# Patient Record
Sex: FEMALE | URBAN - METROPOLITAN AREA
[De-identification: names, ages, dates, MRNs, and addresses within clinical notes are randomized per-mention and may not be internally consistent; named-entity substitution may affect disease eponyms.]

---

## 2017-03-28 ENCOUNTER — APPOINTMENT (RX ONLY)
Dept: URBAN - METROPOLITAN AREA CLINIC 124 | Facility: CLINIC | Age: 82
Setting detail: DERMATOLOGY
End: 2017-03-28

## 2017-03-28 DIAGNOSIS — L82.1 OTHER SEBORRHEIC KERATOSIS: ICD-10-CM

## 2017-03-28 DIAGNOSIS — D485 NEOPLASM OF UNCERTAIN BEHAVIOR OF SKIN: ICD-10-CM

## 2017-03-28 PROBLEM — D48.5 NEOPLASM OF UNCERTAIN BEHAVIOR OF SKIN: Status: ACTIVE | Noted: 2017-03-28

## 2017-03-28 PROCEDURE — 11101: CPT

## 2017-03-28 PROCEDURE — ? COUNSELING

## 2017-03-28 PROCEDURE — ? BIOPSY BY SHAVE METHOD

## 2017-03-28 PROCEDURE — 99213 OFFICE O/P EST LOW 20 MIN: CPT | Mod: 25

## 2017-03-28 PROCEDURE — 11100: CPT

## 2017-03-28 ASSESSMENT — LOCATION DETAILED DESCRIPTION DERM
LOCATION DETAILED: LEFT INFERIOR CENTRAL MALAR CHEEK
LOCATION DETAILED: RIGHT INFERIOR CENTRAL MALAR CHEEK
LOCATION DETAILED: LEFT INFERIOR NASAL CHEEK

## 2017-03-28 ASSESSMENT — LOCATION ZONE DERM: LOCATION ZONE: FACE

## 2017-03-28 ASSESSMENT — LOCATION SIMPLE DESCRIPTION DERM
LOCATION SIMPLE: RIGHT CHEEK
LOCATION SIMPLE: LEFT CHEEK

## 2017-03-28 NOTE — PROCEDURE: BIOPSY BY SHAVE METHOD
Bill For Surgical Tray: no
X Size Of Lesion In Cm: 0
Notification Instructions: Patient will be notified of biopsy results. However, patient instructed to call the office if not contacted within 2 weeks.
Lab: [facility]
Electrodesiccation And Curettage Text: The wound bed was treated with electrodesiccation and curettage after the biopsy was performed.
Body Location Override (Optional - Billing Will Still Be Based On Selected Body Map Location If Applicable): Right cheek
Biopsy Type: H and E
Billing Type: [employer]
Lab Facility: [id number]
Hemostasis: Aluminum Chloride
Dressing: pressure dressing with telfa
Type Of Destruction Used: Curettage
Size Of Lesion In Cm: 1.4
Anesthesia Type: 1% lidocaine with 1:100,000 epinephrine and a 1:10 solution of 8.4% sodium bicarbonate
Curettage Text: The wound bed was treated with curettage after the biopsy was performed.
Consent: Written consent was obtained and risks were reviewed including but not limited to scarring, infection, bleeding, scabbing, incomplete removal, nerve damage and allergy to anesthesia.
Detail Level: Simple
Wound Care: Vaseline
Electrodesiccation Text: The wound bed was treated with electrodesiccation after the biopsy was performed.
Cryotherapy Text: The wound bed was treated with cryotherapy after the biopsy was performed.
Anesthesia Volume In Cc (Will Not Render If 0): 0.3
Post-Care Instructions: I reviewed with the patient in detail post-care instructions. Patient is to keep the biopsy site dry overnight, and then apply bacitracin twice daily until healed. Patient may apply hydrogen peroxide soaks to remove any crusting.
Biopsy Method: Double edge Personna blades
Silver Nitrate Text: The wound bed was treated with silver nitrate after the biopsy was performed.
Size Of Lesion In Cm: 0.4
Billing Type: Third-Party Bill
Lab: [id number]
Lab Facility: [id number]
Body Location Override (Optional - Billing Will Still Be Based On Selected Body Map Location If Applicable): Left nasal sidewall

## 2017-04-27 ENCOUNTER — IMPORTED ENCOUNTER (OUTPATIENT)
Dept: URBAN - METROPOLITAN AREA CLINIC 31 | Facility: CLINIC | Age: 82
End: 2017-04-27

## 2017-04-27 PROBLEM — H50.10: Noted: 2017-04-27

## 2017-04-27 PROBLEM — H27.02: Noted: 2017-04-27

## 2017-04-27 PROBLEM — H40.1432: Noted: 2017-04-27

## 2017-04-27 PROBLEM — Z96.1: Noted: 2017-04-27

## 2017-04-27 PROCEDURE — 92060 SENSORIMOTOR EXAMINATION: CPT

## 2017-04-27 PROCEDURE — 92015 DETERMINE REFRACTIVE STATE: CPT

## 2017-04-27 PROCEDURE — V2520 CONTACT LENS HYDROPHILIC: HCPCS

## 2017-04-27 PROCEDURE — 92014 COMPRE OPH EXAM EST PT 1/>: CPT

## 2017-04-27 PROCEDURE — 92133 CPTRZD OPH DX IMG PST SGM ON: CPT

## 2017-04-27 NOTE — PATIENT DISCUSSION
1.  PXE OU - Stable glaucoma. Continue with current treatment plan. Discussed importance of compliance. Will continue to monitor. 2.  Psuedophakia OD - IOL stable. Monitor. 3. Aphakia OS - OK to refill CL RX4. 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 VF 24-2. with Dr. David Rivers.

## 2017-12-21 ENCOUNTER — IMPORTED ENCOUNTER (OUTPATIENT)
Dept: URBAN - METROPOLITAN AREA CLINIC 31 | Facility: CLINIC | Age: 82
End: 2017-12-21

## 2017-12-21 PROBLEM — H40.1112: Noted: 2017-12-21

## 2017-12-21 PROBLEM — H27.02: Noted: 2017-12-21

## 2017-12-21 PROBLEM — H40.1432: Noted: 2017-12-21

## 2017-12-21 PROBLEM — Z96.1: Noted: 2017-12-21

## 2017-12-21 PROBLEM — H40.1121: Noted: 2017-12-21

## 2017-12-21 PROCEDURE — 99213 OFFICE O/P EST LOW 20 MIN: CPT

## 2017-12-21 PROCEDURE — 92083 EXTENDED VISUAL FIELD XM: CPT

## 2017-12-21 NOTE — PATIENT DISCUSSION
1.  PXE OU - Continue with current treatment plan. Discussed importance of compliance. Will continue to monitor. 2.  Pseudophakia OD - IOL stable. Monitor. 3. Aphakia OS - Pt interested in new CL OS next visit recheck fit with Dr Luke Hernandez. Return for an appointment in 4 months for comprehensive exam. OCT Nerve. with Dr. Beth Hassan.

## 2018-02-13 ENCOUNTER — IMPORTED ENCOUNTER (OUTPATIENT)
Dept: URBAN - METROPOLITAN AREA CLINIC 31 | Facility: CLINIC | Age: 83
End: 2018-02-13

## 2018-02-13 PROCEDURE — 92310 CONTACT LENS FITTING OU: CPT

## 2018-02-13 PROCEDURE — 92015 DETERMINE REFRACTIVE STATE: CPT

## 2018-02-13 PROCEDURE — V2520 CONTACT LENS HYDROPHILIC: HCPCS

## 2018-02-13 NOTE — PATIENT DISCUSSION
1.  Cont with current cl in left eye- No OR improved any better. Pt happy with brand DACP 6.00 Dailies. Eval 802. Gave copy of rx so pt can get gls for when she takes her cl sout for right eyue. 3.  FU with DR Mi per his schedule.

## 2018-12-13 ENCOUNTER — IMPORTED ENCOUNTER (OUTPATIENT)
Dept: URBAN - METROPOLITAN AREA CLINIC 31 | Facility: CLINIC | Age: 83
End: 2018-12-13

## 2018-12-13 PROBLEM — Z96.1: Noted: 2018-12-13

## 2018-12-13 PROBLEM — H27.02: Noted: 2018-12-13

## 2018-12-13 PROBLEM — H40.1432: Noted: 2018-12-13

## 2018-12-13 PROCEDURE — 92310 CONTACT LENS FITTING OU: CPT

## 2018-12-13 PROCEDURE — 92133 CPTRZD OPH DX IMG PST SGM ON: CPT

## 2018-12-13 PROCEDURE — 92014 COMPRE OPH EXAM EST PT 1/>: CPT

## 2018-12-13 PROCEDURE — 92015 DETERMINE REFRACTIVE STATE: CPT

## 2018-12-13 NOTE — PATIENT DISCUSSION
1.  PXE OU - Continue with current treatment plan. Discussed importance of compliance. Will continue to monitor. 2.  Pseudophakia OD - IOL stable. Monitor. 3. Aphakia OS - Pt interested in new CL OS next visit recheck fit with Dr Pavan Philippe. Return for an appointment in 4 months for comprehensive exam. OCT Nerve. with Dr. Gabby Mcknight.

## 2018-12-13 NOTE — PATIENT DISCUSSION
1.  PXE OU - Continue  Betimol bid ou. LVF 12/17. OCT 12/13/18   Moderate thinnning ou. RNFL 81/90 very poor GCC. stable from [facility] 4/17. Will continue to monitor. 2.  Pseudophakia OD - IOL stable. Monitor. 3. Aphakia OS - 4. Exotropia- stable5.   Cont with current DACP 6.00 OS-- Eval 70. 6.  RTN 6 mths VF

## 2019-04-25 ENCOUNTER — IMPORTED ENCOUNTER (OUTPATIENT)
Dept: URBAN - METROPOLITAN AREA CLINIC 31 | Facility: CLINIC | Age: 84
End: 2019-04-25

## 2019-04-25 PROBLEM — Z96.1: Noted: 2019-04-25

## 2019-04-25 PROBLEM — H40.1432: Noted: 2019-04-25

## 2019-04-25 PROBLEM — H27.02: Noted: 2019-04-25

## 2019-04-25 PROCEDURE — 92015 DETERMINE REFRACTIVE STATE: CPT

## 2019-04-25 PROCEDURE — 99214 OFFICE O/P EST MOD 30 MIN: CPT

## 2019-04-25 NOTE — PATIENT DISCUSSION
1.  PXE OU - Continue with current treatment plan. Discussed importance of compliance. Will continue to monitor. 2.  Pseudophakia OD - IOL stable. Monitor. 3. Aphakia OS - stable with CLReturn for an appointment in 6 months for pressure check. VF 24-2. with Dr. Cooper Pereira.

## 2019-06-14 ENCOUNTER — APPOINTMENT (RX ONLY)
Dept: URBAN - METROPOLITAN AREA CLINIC 116 | Facility: CLINIC | Age: 84
Setting detail: DERMATOLOGY
End: 2019-06-14

## 2019-11-14 ENCOUNTER — IMPORTED ENCOUNTER (OUTPATIENT)
Dept: URBAN - METROPOLITAN AREA CLINIC 31 | Facility: CLINIC | Age: 84
End: 2019-11-14

## 2019-11-14 PROBLEM — H27.02: Noted: 2019-11-14

## 2019-11-14 PROBLEM — Z96.1: Noted: 2019-11-14

## 2019-11-14 PROBLEM — H40.1432: Noted: 2019-11-14

## 2019-11-14 PROCEDURE — 99214 OFFICE O/P EST MOD 30 MIN: CPT

## 2019-11-14 PROCEDURE — 92083 EXTENDED VISUAL FIELD XM: CPT

## 2019-11-14 NOTE — PATIENT DISCUSSION
1.  PXE OU - IOP at target VF stable. Continue with current treatment plan. Discussed importance of compliance. Will continue to monitor. 2.  Pseudophakia OD - IOL stable. Monitor. 3. Aphakia OS - stable with CL OK to refillReturn for an appointment in 6 months for comprehensive exam. OCT Nerve. with Dr. Olga Haywood.

## 2020-05-21 ENCOUNTER — IMPORTED ENCOUNTER (OUTPATIENT)
Dept: URBAN - METROPOLITAN AREA CLINIC 31 | Facility: CLINIC | Age: 85
End: 2020-05-21

## 2020-05-21 PROBLEM — Z96.1: Noted: 2020-05-21

## 2020-05-21 PROBLEM — H27.02: Noted: 2020-05-21

## 2020-05-21 PROBLEM — H40.1432: Noted: 2020-05-21

## 2020-05-21 PROCEDURE — 99213 OFFICE O/P EST LOW 20 MIN: CPT

## 2020-05-21 NOTE — PATIENT DISCUSSION
1.  PXE OU - IOP at target. Continue with current treatment plan. Discussed importance of compliance. Will continue to monitor. 2.  Pseudophakia OD - IOL stable. Monitor. 3. Aphakia OS - stable with CL OK to refillReturn for an appointment in 6 months for pressure check. VF 24-2. Fundus Photo Disc. with Dr. Dacia Hannon.

## 2020-07-13 NOTE — PATIENT DISCUSSION
Patient reports improvement in vision and eye pressure since arrival in [facility] ER. Since arrival, 250 mg of IV dorzolamide was administered.

## 2020-07-13 NOTE — PATIENT DISCUSSION
"POH: Patient reports that has been told she has high pressure in both eyes an dthat this ""runs in the family"".  Denies cataract sx

## 2020-07-13 NOTE — PATIENT DISCUSSION
Patient with hx of leukopenia, uterince cancer, and hypertensin, admitted for hypertensive urgency and for possible peisode of acute angle closure in the right eye. Acetazolamide PO and IV, as well as glaucoma drops, were administered and intraocular pressures in the right eye were reduced to 30mmHG and, most recently 20mmHg. Patient is not in angle closure currently, however, we must continue IOP management until she can see an Ophthalmologist after discharge.

## 2020-07-13 NOTE — PATIENT DISCUSSION
Please call to make a follow up appointment at [place] [phone] with a general eye doctor for a complete slit lamp examination.

## 2020-07-13 NOTE — PATIENT DISCUSSION
Patient transferred to [facility] ER (From [facility]) Prior to transfer, timolol drops and Acetazolamide 500mg PO was administered.

## 2020-07-20 NOTE — PATIENT DISCUSSION
IOP Max at [facility] 7/13/2020: 61 OD.  IOP lowered to 20s after Diamox IV.  Patient has been on Timolol, latanoprost, dorzolamide, brimonidine.

## 2020-07-24 NOTE — PATIENT DISCUSSION
IOP Max at [facility] 7/13/2020: 61 OD.  IOP lowered to 20s after Diamox IV.  Patient has been on Timolol, latanoprost, dorzolamide, brimonidine.

## 2020-07-31 NOTE — PROCEDURE NOTE: CLINICAL
PROCEDURE NOTE: Focal Laser, Retina MicroPulse OS. Diagnosis: Retinal Edema. Prior to focal laser, risks/benefits/alternatives to laser discussed including loss of vision and patient wished to proceed. Spot size: 100 um. Power: 100 mW. Number of pulses: 250. Patient tolerated procedure well. There were no complications. Post procedure instructions given. Carlos Mckeon

## 2020-08-31 NOTE — PATIENT DISCUSSION
pt has worsening CME on OCT MACULA today (8/31/2020) and scattered flame shaped hemorrhage.  Pt. has hx of metastiatic uterine cancer s/p chemo.  Recommend possible anti-VEGF inj.

## 2020-10-16 NOTE — PATIENT DISCUSSION
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  Dr. Nadiya Enrique next available.

## 2020-10-16 NOTE — PROCEDURE NOTE: CLINICAL
PROCEDURE NOTE: Lucentis 0.5 mg OS. Diagnosis: Stable Branch Retinal Vein Occlusion. Anesthesia: Topical. Prep: Betadine Drops and Scrubs. Prior to injection, risks/benefits/alternatives discussed including infection, loss of vision, hemorrhage, cataract, glaucoma, retinal tears or detachment and patient wished to proceed. Informed consent obtained. . Patient was advised the purpose of the treatment was to slow the progression of the disease, and may not improve visual acuity. Betadine prep was performed. Injection site: 3-4 mm from the limbus. Mask worn during procedure. A lid speculum was used. Intravitreal injection of Lucentis 0.5mg/0.05 ml was given. Discarded remaining *. CRA perfusion confirmed. The eye was irrigated with sterile eye rinse solution. The betadine was washed away. Count fingers vision was verified. The patient tolerated the procedure well and there were no complications from the procedure. Post procedure instructions given. Patient given office phone number/answering service number and advised to call immediately should there be an increase in floaters or redness, loss of vision or pain, or should they have any other questions or concerns. Patient was given the standard instruction sheet. Vira Austin

## 2020-10-21 NOTE — PATIENT DISCUSSION
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  Dr. Rob Taylor next available.

## 2020-10-23 NOTE — PATIENT DISCUSSION
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  Dr. Lowery First next available.

## 2020-10-23 NOTE — PATIENT DISCUSSION
improved, Continue pred QID & start cyclogel BID (sample  given), Will check labs ACE, RPR, FTA-ABS, HLA-b27, RF, lyme titer, Script given.

## 2020-10-23 NOTE — PATIENT DISCUSSION
S/P Lucentis on 10/16, maybe 2/2 low thrombolic event or vasculitis, see number one, will check ANCA.

## 2020-10-23 NOTE — PATIENT DISCUSSION
PAN UVEITIS OU, Moderate vitritis, no pain, no evidence of infection, Start pred QID & cyclogel (sample given) BID, will check labs. ACE, RPR, FTA-ABS, HLA-b27, RF, lyme titer, Script given. I have personally performed a face to face diagnostic evaluation on this patient. I have reviewed the ACP note and agree with the history, exam and plan of care, except as noted.

## 2020-10-27 NOTE — PATIENT DISCUSSION
10/27/20: WE'LL AWAIT RESOLUTION/IMPROVEMENT OF UVEITIS TO CONTINUE W ANTI-VEGF SINCE PT MAY BENEFIT FROM STEROIDS. MONITOR CLINICALLY.

## 2020-10-27 NOTE — PATIENT DISCUSSION
The patient was asked to get an EXTENSIVE SEROLOGICAL WORKUP in an effort to identify any systemic autoimmune or infectious etiology as the cause for their severe inflammation. PT GOT HER BLOOD WORK TODAY. WE'LL REVIEW THE RESULTS AND TREAT ACCORDINGLY. MAIN DDX: INFECTIOUS -GIVEN HER IMMUNOSUPPRESSION-, CA OR AUTOIMMUNE (LESS LIKELY). LOW THRESHOLD TO START PO STEROIDS ONCE INFECTION HAS BEEN RULED OUT.

## 2020-11-06 NOTE — PROCEDURE NOTE: CLINICAL
PROCEDURE NOTE: Sub-Tenon’s Kenalog* OD. Diagnosis: Panuveitis. Anesthesia: Topical. Prep: Betadine Flush. Prior to injection, risks/benefits/alternatives discussed including infection, loss of vision, hemorrhage, cataract, glaucoma, elevated intraocular pressure and lid swelling. Betadine prep was performed. Sub-Tenon’s injection of Kenalog 40 mg/1 ml was given. The remainder of the Sub-Tenon’s kenalog was discarded in the medical waste. Time of injection: *. Patient tolerated procedure well. There were no complications. Post-op instructions given. Patient given office phone number/answering service number and advised to call immediately should there be an increase in floaters or redness, loss of vision or pain, or should they have any other questions or concerns. Oleg Ruiz

## 2020-11-19 NOTE — PATIENT DISCUSSION
11/19/20: LIKELY 2/2 #3, BUT POSSIBLY INFLUENCED BY A REVERSE PUPILLARY BLOCK VS IRIS RETRACTION SX- DILATION.

## 2020-11-19 NOTE — PATIENT DISCUSSION
11/6/20: SIGNIFICANT IMPROVEMENT OF INFLAMMATION IN ANTERIOR SEGMENT. LABS UNREMARKABLE. [address] [facility] [address] MCCOY TO REQUEST PET SCAN TO R/O METS FROM UTERINE CA. UNLIKELY TO BE TB, BUT WE'LL SEND TEST. RF + BUT LIKELY FALSE + GIVEN AGE. WILL DO PST OD TO EVAL RESPONSE.

## 2020-11-19 NOTE — PATIENT DISCUSSION
WILL CONT ATROPINE. REINFORCED IMPORTANCE OF MEDICATION TO AVOID IOP SPIKES. WILL DO GONIO NEXT VISIT, ANGLE SEEMS TO BE OPEN.  PO STEROIDS 60MG/D.

## 2020-11-19 NOTE — PATIENT DISCUSSION
11/19/20: SIMILAR AMT OF INFLAMMATION. PT WILL UNDERGO PET SCAN NEXT WK TO DETERMINE INFLUENCE OF SYSTEMIC CONDITION, BUT LIKELY 2/2 Renelle Ped.

## 2020-12-04 NOTE — PATIENT DISCUSSION
12/4/20: DISCUSSED W PT RESULTS OF PET SCAN: SYSTEMIC PROGRESSION. OCULAR INFLAMMATION ONLY. PRED 60 MG/D AND F/U IN 1WK. PT IS AWARE.

## 2020-12-04 NOTE — PATIENT DISCUSSION
11/6/20: SIGNIFICANT IMPROVEMENT OF INFLAMMATION IN ANTERIOR SEGMENT. LABS UNREMARKABLE. [address] [facility] [address] MCCOY TO REQUEST PET SCAN TO R/O METS FROM UTERINE CA. UNLIKELY TO BE TB, BUT WE'LL SEND TEST. RF + BUT LIKELY FALSE + GIVEN AGE. WILL DO PST OD TO EVAL RESPONSE.

## 2020-12-04 NOTE — PATIENT DISCUSSION
11/19/20: SIMILAR AMT OF INFLAMMATION. PT WILL UNDERGO PET SCAN NEXT WK TO DETERMINE INFLUENCE OF SYSTEMIC CONDITION, BUT LIKELY 2/2 Jen Herrera.

## 2020-12-14 NOTE — PATIENT DISCUSSION
11/19/20: SIMILAR AMT OF INFLAMMATION. PT WILL UNDERGO PET SCAN NEXT WK TO DETERMINE INFLUENCE OF SYSTEMIC CONDITION, BUT LIKELY 2/2 Paulette Ford.

## 2020-12-14 NOTE — PATIENT DISCUSSION
12/14/20: PT HASN'T SEEN DR MCCOY. SHE'S NOT KEEN ABOUT CHEMO, WHICH IS HER ONLY OPTION AT THIS TIME. PRED HAS CAUSED WORSE EDEMA IN HER BODY, BUT HER EYES ARE SLOWLY GETTING BETTER. VA IN OD IS LIMITED 2/2 PSC & INFLAMMATION. WILL EVAL IN 1 WK AFTER 2 WKS OF TX W PREDNISONE. CONSIDER ANTI-VEGF. DISCUSSED AGAIN [address] [place].

## 2020-12-14 NOTE — PATIENT DISCUSSION
12/14/20: likely RELATED TO UVEITIS. WILL CONT TX WITH PREDNISONE & REASSESS AFTER EVAL BY DR MCCOY.

## 2020-12-14 NOTE — PATIENT DISCUSSION
11/6/20: SIGNIFICANT IMPROVEMENT OF INFLAMMATION IN ANTERIOR SEGMENT. LABS UNREMARKABLE. [address] [facility] [address] MCCOY TO REQUEST PET SCAN TO R/O METS FROM UTERINE CA. UNLIKELY TO BE TB, BUT WE'LL SEND TEST. RF + BUT LIKELY FALSE + GIVEN AGE. WILL DO PST OD TO EVAL RESPONSE.

## 2020-12-22 NOTE — PATIENT DISCUSSION
12/22/20: PT [facility]. WILL REASSESS W SCAN IN FEB. SIGNIFICANT IMPROVEMENT SEEN TODAY OD>OS. WILL START TAPERING PREDNISONE TO 50 & PF SLOWLY SINCE PT HAS LYMPHEDEMA.

## 2020-12-22 NOTE — PATIENT DISCUSSION
12/14/20: PT HASN'T SEEN DR MCCOY. SHE'S NOT KEEN ABOUT CHEMO, WHICH IS HER ONLY OPTION AT THIS TIME. PRED HAS CAUSED WORSE EDEMA IN HER BODY, BUT HER EYES ARE SLOWLY GETTING BETTER. VA IN OD IS LIMITED 2/2 PSC & INFLAMMATION. WILL EVAL IN 1 WK AFTER 2 WKS OF TX W PREDNISONE. CONSIDER ANTI-VEGF. DISCUSSED AGAIN [address] [place].

## 2020-12-22 NOTE — PATIENT DISCUSSION
11/6/20: SIGNIFICANT IMPROVEMENT OF INFLAMMATION IN ANTERIOR SEGMENT. LABS UNREMARKABLE. [address] [facility] [address] MCCOY TO REQUEST PET SCAN TO R/O METS FROM UTERINE CA. UNLIKELY TO BE TB, BUT WE'LL SEND TEST. RF + BUT LIKELY FALSE + GIVEN AGE. WILL DO PST OD TO EVAL RESPONSE.

## 2020-12-22 NOTE — PATIENT DISCUSSION
11/19/20: SIMILAR AMT OF INFLAMMATION. PT WILL UNDERGO PET SCAN NEXT WK TO DETERMINE INFLUENCE OF SYSTEMIC CONDITION, BUT LIKELY 2/2 Rivas Bunting.

## 2021-01-05 NOTE — PATIENT DISCUSSION
11/6/20: SIGNIFICANT IMPROVEMENT OF INFLAMMATION IN ANTERIOR SEGMENT. LABS UNREMARKABLE. [address] [facility] [address] MCCOY TO REQUEST PET SCAN TO R/O METS FROM UTERINE CA. UNLIKELY TO BE TB, BUT WE'LL SEND TEST. RF + BUT LIKELY FALSE + GIVEN AGE. WILL DO PST OD TO EVAL RESPONSE.

## 2021-01-05 NOTE — PATIENT DISCUSSION
12/22/20: PT 10 [facility]. WILL REASSESS W SCAN IN FEB. SIGNIFICANT IMPROVEMENT SEEN TODAY OD>OS. WILL START TAPERING PREDNISONE TO 50 & PF SLOWLY SINCE PT HAS LYMPHEDEMA.

## 2021-01-05 NOTE — PATIENT DISCUSSION
12/14/20: PT HASN'T SEEN DR MCCOY. SHE'S NOT KEEN ABOUT CHEMO, WHICH IS HER ONLY OPTION AT THIS TIME. PRED HAS CAUSED WORSE EDEMA IN HER BODY, BUT HER EYES ARE SLOWLY GETTING BETTER. VA IN OD IS LIMITED 2/2 PSC & INFLAMMATION. WILL EVAL IN 1 WK AFTER 2 WKS OF TX W PREDNISONE. CONSIDER ANTI-VEGF. [address]CUSSED AGAIN [address] [place].

## 2021-01-05 NOTE — PATIENT DISCUSSION
11/19/20: SIMILAR AMT OF INFLAMMATION. PT WILL UNDERGO PET SCAN NEXT WK TO DETERMINE INFLUENCE OF SYSTEMIC CONDITION, BUT LIKELY 2/2 Sung Milan.

## 2021-01-22 NOTE — PROCEDURE NOTE: CLINICAL
PROCEDURE NOTE: Intravitreal Triesence OD. Diagnosis: Cystoid Macular Edema. Anesthesia: Subconjunctival. Prep: Betadine Flush. Prior to injection, risks/benefits/alternatives discussed including infection, loss of vision, hemorrhage, cataract, glaucoma, retinal tears or detachment. The off-label status of Intravitreal Triesence also was reviewed. The patient wished to proceed with treatment. The patient was seated in the examination chair. Topical anesthesia was induced with Alcaine. Additional anesthesia was achieved using drop(s) or injection checked above. A drop of Povidone-iodine 5% ophthalmic solution was instilled over the injection site and in the inferior fornix. Betadine prep was performed. A 1 cc syringe with a #27 gauge 1/2” needle was used to inject a total of 0.1 cc of Triamcinolone Acetonide 40 mg/ml, which is 4 mg of Triamcinolone Acetonide, into the vitreous cavity. The remainder of the intravitreal Triesence in the single-use vial was then discarded in a medical waste disposal container. The needle was passed 3.0 mm posterior to the limbus in pseudophakic patients, and 3.5 mm posterior to the limbus in phakic patients. The injection was made inferotemporally. The majority of the Triamcinolone Acetonide settled inferiorly. The retina was examined following the injection. Injection Time: *. IOP Time: *. The pressure 10 minutes after the injection was *. Patient tolerated procedure well. There were no complications. Post injection instructions given. The patient was instructed of a follow up appointment for 6 weeks and was told to call immediately if his/her vision decreased, and/or if his/her eye became red, painful, and/or light sensitive. If the patient is unable to reach the doctor within an hour or two, the patient was instructed to go to the emergency room or call 911. The patient was instructed to use Artificial Tears q.i.d. p.r.n for comfort. Kaela Heard

## 2021-01-22 NOTE — PATIENT DISCUSSION
11/6/20: SIGNIFICANT IMPROVEMENT OF INFLAMMATION IN ANTERIOR SEGMENT. LABS UNREMARKABLE. [address] [facility] [address] MCCOY TO REQUEST PET SCAN TO R/O METS FROM UTERINE CA. UNLIKELY TO BE TB, BUT WE'LL SEND TEST. RF + BUT LIKELY FALSE + GIVEN AGE. WILL DO PST OD TO EVAL RESPONSE.

## 2021-01-22 NOTE — PATIENT DISCUSSION
Based on today’s exam, diagnostic studies, and review of records, the determination was made for TRIESENSE 4mg recommended TODAY after discussion of benefits, risks and alternatives. WILL TREAT OD ONLY AND SEE RESPONSE BEFORE TX OS. The injection was given without complication and tolerated well by the patient. Post-injection instructions were reviewed and understood by the patient. Procedure was performed without complications. Instructed to call immediately if any new distortion, blurring, decreased vision or eye pain.

## 2021-01-22 NOTE — PATIENT DISCUSSION
11/19/20: SIMILAR AMT OF INFLAMMATION. PT WILL UNDERGO PET SCAN NEXT WK TO DETERMINE INFLUENCE OF SYSTEMIC CONDITION, BUT LIKELY 2/2 Mariusz Si.

## 2021-01-22 NOTE — PATIENT DISCUSSION
12/14/20: PT HASN'T SEEN DR MCCOY. SHE'S NOT KEEN ABOUT CHEMO, WHICH IS HER ONLY OPTION AT THIS TIME. PRED HAS CAUSED WORSE EDEMA IN HER BODY, BUT HER EYES ARE SLOWLY GETTING BETTER. VA IN OD IS LIMITED 2/2 PSC & INFLAMMATION. WILL EVAL IN 1 WK AFTER 2 WKS OF TX W PREDNISONE. CONSIDER ANTI-VEGF. DISCUSSED AGAIN [address] [place].

## 2021-01-22 NOTE — PATIENT DISCUSSION
1/22/21: UNCLEAR ETIOLOGY. TRIESENCE WILL HELP US TO IMPROVE IOP. NO RD ON US, BUT PT IS APPLYING BRIMONIDINE. TO D/C NOW.

## 2021-01-22 NOTE — PATIENT DISCUSSION
12/22/20: PT 10 [facility]. WILL REASSESS W SCAN IN FEB. SIGNIFICANT IMPROVEMENT SEEN TODAY OD>OS. WILL START TAPERING PREDNISONE TO 50 & PF SLOWLY SINCE PT HAS LYMPHEDEMA.

## 2021-01-22 NOTE — PATIENT DISCUSSION
1/22/21: SIGN IMPROVEMENT. NO INFLAMM ANTERIORLY BUT REACTIVATION OF CME. WILL TREAT W TRIESENCE TO EVAL RESPONSE BEFORE TX OS.

## 2021-01-22 NOTE — PATIENT DISCUSSION
1/22/21: RECURRED. COULD BE 2/2 TAPERING OF PREDNISONE. WILL TREAT W TRIESENCE & EVAL IMMUNOSUPRESSORS IF NO RESPONSE.  NO MANY OPTIONS GIVEN SYSTEMIC STATE W CANCER TX.

## 2021-02-12 NOTE — PATIENT DISCUSSION
12/14/20: PT HASN'T SEEN DR MCCOY. SHE'S NOT KEEN ABOUT CHEMO, WHICH IS HER ONLY OPTION AT THIS TIME. PRED HAS CAUSED WORSE EDEMA IN HER BODY, BUT HER EYES ARE SLOWLY GETTING BETTER. VA IN OD IS LIMITED 2/2 PSC & INFLAMMATION. WILL EVAL IN 1 WK AFTER 2 WKS OF TX W PREDNISONE. CONSIDER ANTI-VEGF. DISCUSSED AGAIN [address] [place].

## 2021-02-12 NOTE — PATIENT DISCUSSION
11/19/20: SIMILAR AMT OF INFLAMMATION. PT WILL UNDERGO PET SCAN NEXT WK TO DETERMINE INFLUENCE OF SYSTEMIC CONDITION, BUT LIKELY 2/2 Mac Lisa.

## 2021-02-12 NOTE — PATIENT DISCUSSION
12/22/20: PT 10 [facility]. WILL REASSESS W SCAN IN FEB. SIGNIFICANT IMPROVEMENT SEEN TODAY OD>OS. WILL START TAPERING PREDNISONE TO 50 & PF SLOWLY SINCE PT HAS LYMPHEDEMA.

## 2021-02-12 NOTE — PATIENT DISCUSSION
2/12/21: PERSISTENT CME. TX AS NEEDED W FURTHER TAPERING OF PREDNISONE GIVEN SIDE EFFECTS. OVERALL GENERAL CONDITION IS STABLE.

## 2021-02-12 NOTE — PATIENT DISCUSSION
11/6/20: SIGNIFICANT IMPROVEMENT OF INFLAMMATION IN ANTERIOR SEGMENT. LABS UNREMARKABLE. [address] [facility] [address] MCCOY TO REQUEST PET SCAN TO R/O METS FROM UTERINE CA. UNLIKELY TO BE TB, BUT WE'LL SEND TEST. RF + BUT LIKELY FALSE + GIVEN AGE. WILL DO PST OD TO EVAL RESPONSE.

## 2021-02-12 NOTE — PROCEDURE NOTE: CLINICAL
PROCEDURE NOTE: Intravitreal Triesence #1 OS. Diagnosis: Cystoid Macular Edema. Anesthesia: Topical. Prep: Betadine Flush. Prior to injection, risks/benefits/alternatives discussed including infection, loss of vision, hemorrhage, cataract, glaucoma, retinal tears or detachment. The off-label status of Intravitreal Triesence also was reviewed. The patient wished to proceed with treatment. The patient was seated in the examination chair. Topical anesthesia was induced with Alcaine. Additional anesthesia was achieved using drop(s) or injection checked above. A drop of Povidone-iodine 5% ophthalmic solution was instilled over the injection site and in the inferior fornix. Betadine prep was performed. A 1 cc syringe with a #27 gauge 1/2” needle was used to inject a total of 0.1 cc of Triamcinolone Acetonide 40 mg/ml, which is 4 mg of Triamcinolone Acetonide, into the vitreous cavity. The remainder of the intravitreal Triesence in the single-use vial was then discarded in a medical waste disposal container. The needle was passed 3.0 mm posterior to the limbus in pseudophakic patients, and 3.5 mm posterior to the limbus in phakic patients. The injection was made inferotemporally. The majority of the Triamcinolone Acetonide settled inferiorly. The retina was examined following the injection. Injection Time: 1200 PM. IOP Time: *. The pressure 10 minutes after the injection was *. Patient tolerated procedure well. There were no complications. Post injection instructions given. The patient was instructed of a follow up appointment for 6 weeks and was told to call immediately if his/her vision decreased, and/or if his/her eye became red, painful, and/or light sensitive. If the patient is unable to reach the doctor within an hour or two, the patient was instructed to go to the emergency room or call 911. The patient was instructed to use Artificial Tears q.i.d. p.r.n for comfort. Chani Lyn

## 2021-04-21 NOTE — PROCEDURE NOTE: CLINICAL
PROCEDURE NOTE: Intravitreal Triesence #2 OD. Diagnosis: Cystoid Macular Edema. Anesthesia: Lidocaine 4%. Prep: Betadine Flush. Prior to injection, risks/benefits/alternatives discussed including infection, loss of vision, hemorrhage, cataract, glaucoma, retinal tears or detachment. The off-label status of Intravitreal Triesence also was reviewed. The patient wished to proceed with treatment. The patient was seated in the examination chair. Topical anesthesia was induced with Alcaine. Additional anesthesia was achieved using drop(s) or injection checked above. A drop of Povidone-iodine 5% ophthalmic solution was instilled over the injection site and in the inferior fornix. Betadine prep was performed. A 1 cc syringe with a #27 gauge 1/2” needle was used to inject a total of 0.1 cc of Triamcinolone Acetonide 40 mg/ml, which is 4 mg of Triamcinolone Acetonide, into the vitreous cavity. The remainder of the intravitreal Triesence in the single-use vial was then discarded in a medical waste disposal container. The needle was passed 3.0 mm posterior to the limbus in pseudophakic patients, and 3.5 mm posterior to the limbus in phakic patients. The injection was made inferotemporally. The majority of the Triamcinolone Acetonide settled inferiorly. The retina was examined following the injection. Injection Time: *. IOP Time: *. The pressure 10 minutes after the injection was *. Patient tolerated procedure well. There were no complications. Post injection instructions given. The patient was instructed of a follow up appointment for 6 weeks and was told to call immediately if his/her vision decreased, and/or if his/her eye became red, painful, and/or light sensitive. If the patient is unable to reach the doctor within an hour or two, the patient was instructed to go to the emergency room or call 911. The patient was instructed to use Artificial Tears q.i.d. p.r.n for comfort. Pennye Hand

## 2021-04-29 ENCOUNTER — IMPORTED ENCOUNTER (OUTPATIENT)
Dept: URBAN - METROPOLITAN AREA CLINIC 31 | Facility: CLINIC | Age: 86
End: 2021-04-29

## 2021-04-29 PROBLEM — H27.02: Noted: 2021-04-29

## 2021-04-29 PROBLEM — H40.1432: Noted: 2021-04-29

## 2021-04-29 PROBLEM — Z96.1: Noted: 2021-04-29

## 2021-04-29 PROCEDURE — 92250 FUNDUS PHOTOGRAPHY W/I&R: CPT

## 2021-04-29 PROCEDURE — 92083 EXTENDED VISUAL FIELD XM: CPT

## 2021-04-29 PROCEDURE — 99213 OFFICE O/P EST LOW 20 MIN: CPT

## 2021-04-29 NOTE — PATIENT DISCUSSION
1.  PXE OU - VF worse today progression vs FP  Start Dorzolamide-Timolol BID OU stop Timolol only drop but use till gets new drop. .  Discussed importance of compliance. Will continue to monitor. 2.  Pseudophakia OD - IOL stable. Monitor. 3. Aphakia OS - stable with CL. Followup on 6 months for dilated fundus exam. VF 24-2. with Dr. Cooper Pereira.

## 2021-05-21 NOTE — PATIENT DISCUSSION
5/21/21: IN PREPARATION FOR CE, WILL TX OD TODAY W AVASTIN (Eladia Pires LAST MO) & REASSESS IN 1 MO OU.

## 2021-05-21 NOTE — PATIENT DISCUSSION
11/6/20: SIGNIFICANT IMPROVEMENT OF INFLAMMATION IN ANTERIOR SEGMENT. LABS UNREMARKABLE. [address] [facility] [address] MCCOY TO REQUEST PET SCAN TO R/O METS FROM UTERINE CA. UNLIKELY TO BE TB, BUT WE'LL SEND TEST. RF + BUT LIKELY FALSE + GIVEN AGE. WILL DO PST OD TO EVAL RESPONSE.

## 2021-05-21 NOTE — PATIENT DISCUSSION
11/19/20: SIMILAR AMT OF INFLAMMATION. PT WILL UNDERGO PET SCAN NEXT WK TO DETERMINE INFLUENCE OF SYSTEMIC CONDITION, BUT LIKELY 2/2 Vipul Shall.

## 2021-05-21 NOTE — PROCEDURE NOTE: CLINICAL
PROCEDURE NOTE: Avastin 1.25mg #1 OD. Diagnosis: Cystoid Macular Edema. Anesthesia: Subconjunctival. Prep: Betadine Drops. Prior to injection, risks/benefits/alternatives discussed including infection, loss of vision, hemorrhage, cataract, glaucoma, retinal tears or detachment. The off-label status of Intravitreal Avastin also was reviewed. The patient wished to proceed with treatment. Topical anesthesia was induced with Alcaine. Additional anesthesia was achieved using drop(s) or injection checked above. A drop of Povidone-iodine 5% ophthalmic solution was instilled over the injection site and in the inferior fornix. Betadine prep was performed. Using the syringe provided, Avastin 1.25 mg in 0.05 cc was injected into the vitreous cavity. The needle was passed 3.0 mm posterior to the limbus in pseudophakic patients, and 3.5 mm posterior to the limbus in phakic patients. Patient tolerated procedure well. There were no complications. Injection time: 344 PM. Lot #: [id number]. Expiration Date: 6122-27-36V46:00:00. Post-op instructions given. Inventory Id: null. The patient was instructed to return for re-evaluation in approximately 4-12 weeks depending on his/her condition and was told to call immediately if vision decreases and/or if his/her eye becomes red, painful, and/or light sensitive. The patient was instructed to go to the emergency room or call 911 if unable to reach the doctor within an hour or two of trying or calling. The patient was instructed to use Artificial Tears q.i.d. p.r.n for comfort. Angely Sanabria

## 2021-05-21 NOTE — PATIENT DISCUSSION
Based on today's exam, diagnostic studies, and/or review of records, the determination was made for treatment today. AVASTIN 1.25mg recommended TODAY after discussion of benefits, risks and alternatives. The injection was given and tolerated well by the patient. Discussed risks, benefits, and alternatives of Intravitreal Avastin including off label use. Post-injection instructions were reviewed and understood by the patient. Procedure was performed without complications. Instructed to call immediately if any new distortion, blurring, decreased vision or eye pain.

## 2021-05-21 NOTE — PATIENT DISCUSSION
4/21/21: PERSISTENT CME OD. OS RESOLVED Jessie Corby. CONSIDER OZURDEX IN FUTURE APPTS. PF QD & PREDNISONE 20 MG.

## 2021-05-21 NOTE — PATIENT DISCUSSION
5/21/21: TRY TO RESOLVE CME IN PREPARATION FOR CE. NEED TO IMPROVE OD SINCE VISUALIZATION OF RETINA IS SLIGHTLY MORE CHALLENGING.

## 2021-05-21 NOTE — PATIENT DISCUSSION
4/21/21: PERSISTENT OD. WILL TREAT W TRIESENCE TODAY. RETREAT IN OS IN 1 MO IN PREPARATION FOR CE OS.

## 2021-05-21 NOTE — PATIENT DISCUSSION
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

## 2021-05-21 NOTE — PATIENT DISCUSSION
12/22/20: PT 10 [facility]. WILL REASSESS W SCAN IN FEB. SIGNIFICANT IMPROVEMENT SEEN TODAY OD>OS. WILL START TAPERING PREDNISONE TO 50 & PF SLOWLY SINCE PT HAS LYMPHEDEMA.

## 2021-05-21 NOTE — PATIENT DISCUSSION
12/14/20: PT HASN'T SEEN DR MCCOY. SHE'S NOT KEEN ABOUT CHEMO, WHICH IS HER ONLY OPTION AT THIS TIME. PRED HAS CAUSED WORSE EDEMA IN HER BODY, BUT HER EYES ARE SLOWLY GETTING BETTER. VA IN OD IS LIMITED 2/2 PSC & INFLAMMATION. WILL EVAL IN 1 WK AFTER 2 WKS OF TX W PREDNISONE. CONSIDER ANTI-VEGF. DISCUSSED AGAIN [address] [place].

## 2021-06-30 NOTE — PROCEDURE NOTE: CLINICAL
PROCEDURE NOTE: Intravitreal Ozurdex OD. Diagnosis: Uveitis, Unspecified. Anesthesia: Subconjunctival. Prep: Betadine Flush. Prior to injection, risks/benefits/alternatives discussed including infection, loss of vision, hemorrhage, cataract, glaucoma, retinal tears or detachment and patient wished to proceed. The patient was seated in the examination chair. Topical anesthesia was induced with Proparicaine 0.5%. Subconjunctival xylocaine 2% approximately 0.5 cc was given for anesthesia. Betadine Prep was performed. The Ozurdex drug implant (dexamethasone 0.7 mg intravitreal implant) was injected into the vitreous cavity. The needle was passed 3.0 mm posterior to the limbus in pseudophakic patients, and 3.5 mm posterior to the limbus in phakic patients. The eye was stabilized using a q tip or cotton tip applicator, and the conjunctiva was displaced from the injection site. The remainder of the intravitreal Ozurdex in the single-use vial was then discarded in a medical waste disposal container. The implant settled inferiorly. The retina was examined following the injection. There was no evidence of hemorrhage, subretinal injection, or retinal detachment. Patient tolerated procedure well. There were no complications. Post-op instructions given. Lot # [id number]. Expiration date: 2:43PM/*. The patient was instructed of a follow up appointment in approximately 6 weeks for a limited exam and pressure check and was told to call immediately if the vision decreases and/or if the patient’s eye becomes red, painful, and/or light sensitive. If the patient is unable to reach the doctor within an hour or two, the patient is instructed to go to the emergency room or call 911. Inventory Id: *. The patient was instructed to use Artificial Tears q.i.d. p.r.n for comfort. Chantal Barros PROCEDURE NOTE: Intravitreal Ozurdex OS. Diagnosis: Uveitis, Unspecified. Anesthesia: Subconjunctival. Prep: Betadine Flush. Prior to injection, risks/benefits/alternatives discussed including infection, loss of vision, hemorrhage, cataract, glaucoma, retinal tears or detachment and patient wished to proceed. The patient was seated in the examination chair. Topical anesthesia was induced with Proparicaine 0.5%. Subconjunctival xylocaine 2% approximately 0.5 cc was given for anesthesia. Betadine Prep was performed. The Ozurdex drug implant (dexamethasone 0.7 mg intravitreal implant) was injected into the vitreous cavity. The needle was passed 3.0 mm posterior to the limbus in pseudophakic patients, and 3.5 mm posterior to the limbus in phakic patients. The eye was stabilized using a q tip or cotton tip applicator, and the conjunctiva was displaced from the injection site. The remainder of the intravitreal Ozurdex in the single-use vial was then discarded in a medical waste disposal container. The implant settled inferiorly. The retina was examined following the injection. There was no evidence of hemorrhage, subretinal injection, or retinal detachment. Patient tolerated procedure well. There were no complications. Post-op instructions given. Lot # [id number]. Expiration date: */*. The patient was instructed of a follow up appointment in approximately 6 weeks for a limited exam and pressure check and was told to call immediately if the vision decreases and/or if the patient’s eye becomes red, painful, and/or light sensitive. If the patient is unable to reach the doctor within an hour or two, the patient is instructed to go to the emergency room or call 911. Inventory Id: *. The patient was instructed to use Artificial Tears q.i.d. p.r.n for comfort. Chantal Barros

## 2021-06-30 NOTE — PATIENT DISCUSSION
12/14/20: PT HASN'T SEEN DR MCCOY. SHE'S NOT KEEN ABOUT CHEMO, WHICH IS HER ONLY OPTION AT THIS TIME. PRED HAS CAUSED WORSE EDEMA IN HER BODY, BUT HER EYES ARE SLOWLY GETTING BETTER. VA IN OD IS LIMITED 2/2 PSC & INFLAMMATION. WILL EVAL IN 1 WK AFTER 2 WKS OF TX W PREDNISONE. CONSIDER ANTI-VEGF. DISCUSSED AGAIN [address] OF [place].

## 2021-06-30 NOTE — PATIENT DISCUSSION
5/21/21: IN PREPARATION FOR CE, WILL TX OD TODAY W AVASTIN (Carter Childers LAST MO) & REASSESS IN 1 MO OU.

## 2021-06-30 NOTE — PATIENT DISCUSSION
11/6/20: SIGNIFICANT IMPROVEMENT OF INFLAMMATION IN ANTERIOR SEGMENT. LABS UNREMARKABLE. [address] [facility] [address] MCCOY TO REQUEST PET SCAN TO R/O METS FROM UTERINE CA. UNLIKELY TO BE TB, BUT WE'LL SEND TEST. RF + BUT LIKELY FALSE + GIVEN AGE. WILL DO PST OD TO EVAL RESPONSE.

## 2021-06-30 NOTE — PATIENT DISCUSSION
6/30/21: IMPROVING OD, RECURRENT OS.  IN BOTH EYES EFFECT OF TRIESENCE HAS PASSED, WILL TRY TO IMPROVE/MAXIMIZE STATUS IN PREPARATION FOR CE.

## 2021-06-30 NOTE — PATIENT DISCUSSION
6/30/21: NO SIGNS OF ACTIVITY ANTERIORLY BUT CME HAS RECURRED. WILL DO OZURDEX IN PREPARATION FOR CATARACT SURGERY.

## 2021-06-30 NOTE — PATIENT DISCUSSION
12/22/20: PT 10 [facility]. WILL REASSESS W SCAN IN FEB. SIGNIFICANT IMPROVEMENT SEEN TODAY OD>OS. WILL START TAPERING PREDNISONE TO 50 & PF SLOWLY SINCE PT HAS LYMPHEDEMA.

## 2021-06-30 NOTE — PATIENT DISCUSSION
4/21/21: PERSISTENT CME OD. OS RESOLVED Dave Hilliard. CONSIDER OZURDEX IN FUTURE APPTS. PF QD & PREDNISONE 20 MG.

## 2021-06-30 NOTE — PATIENT DISCUSSION
6/30/21: WE MAY NEVER GET COMPLETE RESOLUTION OF CME BEFORE CE, HOWEVER WE'LL TRY TO GET IT AS STABLE AS POSSIBLE. WOULD START W OD. SHE WILL NEED STRONG PERIOPERATIVE ANTI-INFLAMMATORY TX TO AVOID FURTHER WORSENING (PO STEROIDS + PERIOCULAR STEROID + DROPS).

## 2021-07-12 NOTE — PATIENT DISCUSSION
Continue following with Dr. Brianda Roberto Possibly secondary to [facility] (for Uterine cancer).

## 2021-07-12 NOTE — PROCEDURE NOTE: CLINICAL
PROCEDURE NOTE: Punctal Plugs, Valene Perches ([id number], [id number]) OU. Diagnosis: Dry Eye Syndrome. Prior to treatment, the risks/benefits/alternatives were discussed. The patient wished to proceed with procedure. Temporary collagen plugs were inserted. Patient tolerated procedure well. There were no complications. Post procedure instructions given. Santiago Lobato

## 2021-07-12 NOTE — PATIENT DISCUSSION
No transportation needed. No medical clearance needed. Pred and Vigamox sep for post op drops. Extended pred taper after surgery (4x x2 weeks, 3x x2 weeks, 2x x 2 weeks, 1x x2weeks).

## 2021-07-12 NOTE — PATIENT DISCUSSION
7/12/21: Pt has hx of uveitis and macular edema ou, recently with Ozurdex injection OU by Dr. Lizbeth Carmona, on oral steroids 20mg.  Will defer to Dr. Lizbeth Carmona for PO steroid instructions after cat surgery.

## 2021-07-22 NOTE — PATIENT DISCUSSION
7/12/21: Pt has hx of uveitis and macular edema ou, recently with Ozurdex injection OU by Dr. Fatmata Monaco, on oral steroids 20mg.  Will defer to Dr. Fatmata Monaco for PO steroid instructions after cat surgery.

## 2021-07-22 NOTE — PATIENT DISCUSSION
7/12/21: Pt has hx of uveitis and macular edema ou, recently with Ozurdex injection OU by Dr. Elvira Dao, on oral steroids 20mg.  Will defer to Dr. Elvira Dao for PO steroid instructions after cat surgery.

## 2021-07-22 NOTE — PATIENT DISCUSSION
Continue following with Dr. Natty Arguello Possibly secondary to [facility] (for Uterine cancer).

## 2021-07-23 NOTE — PATIENT DISCUSSION
Continue following with Dr. Aida Storm Possibly secondary to [facility] (for Uterine cancer).

## 2021-07-23 NOTE — PATIENT DISCUSSION
The Patient is at increased risk for post-operative macular edema and inflammation. In addition to the standard post-operative drops, an additional steroid drop to be tapered over 2 months will be added.

## 2021-07-23 NOTE — PATIENT DISCUSSION
7/12/21: Pt has hx of uveitis and macular edema ou, recently with Ozurdex injection OU by Dr. Elisha Shaffer, on oral steroids 20mg.  Will defer to Dr. Elisha Shaffer for PO steroid instructions after cat surgery.

## 2021-07-23 NOTE — PATIENT DISCUSSION
POD1: Good appearance of IOL. Hypotony-> Patient may have low IOP secondary to uveitis vs. microleak (although siedel negative).   Eye pressure did feel low in OR yesterday prior to incisions. Will add contact lens for possible leak and recheck IOP in a few days .  STOP COSOPT OD.

## 2021-07-26 NOTE — PATIENT DISCUSSION
7/12/21: Pt has hx of uveitis and macular edema ou, recently with Ozurdex injection OU by Dr. Michelle Nguyen, on oral steroids 20mg.  Will defer to Dr. Michelle Nguyen for PO steroid instructions after cat surgery.
Advised against heavy lifting, bending, or straining.
Avoid ocular irritants which include smoke, paint fumes, moving air, etc.
COMPLEX. TLC OU. +Pupil stretch. +/- PAV OU. w/Synecholisis.
Cont following with Dr. Steve Beltrán.
Continue following with Dr. Merissa Chung.
Continue following with Dr. Saul Akers Possibly secondary to [facility] (for Uterine cancer).
Continue inj w/ Dr. Theodora Mayo.
Continue to follow with [address].
Discussed the importance of blood pressure control in the prevention of ocular complications.
Goal: OD 1st Basic Malena, OS 2nd Basic Malena. **PT elects basic malena OU 7/21**.
Grade I hypertensive retinopathy.
IOP is controlled today.
LIkely secondary to oxurdex and steroid use, recommend starting Cosopt  PF BID OU.
Monitor.
No transportation needed. No medical clearance needed. Pred and Vigamox sep for post op drops. Extended pred taper after surgery (4x x2 weeks, 3x x2 weeks, 2x x 2 weeks, 1x x2weeks).
POD1: Good appearance of IOL. Hypotony-> Patient may have low IOP secondary to uveitis vs. microleak (although siedel negative).   Eye pressure did feel low in OR yesterday prior to incisions. Will add contact lens for possible leak and recheck IOP in a few days .  STOP COSOPT OD.
POD4: patient continues with hypotony, although continues to be margarita negative.  Descemet folds secondary to hypotony. BCL removed. Spoke with Dr. Otto Sarabia who mentions that patient has had hx of cyclical hypotony.  Will refer to Dr. Otto Sarabia for hypotony followup exam.  Continue post op drops including Prednisolone forte at 6x/day.
PRABHA HUGHES.
Patient elects observation.
Patient made aware of 24/7 emergency services.
Patient qualifies for up to CV Malena OU.
Patient understands condition, prognosis and need for follow up care.
Patient wishes to defer surgery at this time.
Post op instructions reviewed with patients including the use of drops.
Reassess after #1 stable.
Recommend warm compresses, lid scrubs, and PF artificial tears.
Recommended against surgery for now given good vision and lack of impact on activities of daily living.
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
See DIEGO.
Slightly increased ERM, Membrane is not visually significant.  Minimal progression.
Specs obtained today. Observe.
Stressed the importance of controlling vascular risk factors.
The Patient is at increased risk for post-operative macular edema and inflammation. In addition to the standard post-operative drops, an additional steroid drop to be tapered over 2 months will be added.
The patient feels that the cataract is significantly impacting daily activities and has elected cataract surgery. The risks, benefits, and alternatives to surgery were discussed. The patient elects to proceed with surgery.
The patient has undergone maximal medical therapy with artificial tears and is still with signs and symptoms of ocular surface disease / dry eye syndrome. After risks, benefits, and alternatives were discussed, the patient would like to proceed with punctual plugging of bilateral lower puncti.
hx of chemotherapy for leukemia, s/p Carboplaxin s/p Lenvima (caused systemic blood pressure rise).
hypotony on exam one pod1 and pod4-> Patient may have low IOP secondary to uveitis vs. microleak (although siedel negative)   Eye pressure did feel low in OR prior to incisions. Will add contact lens for possible leak and recheck IOP in a few days .  STOP COSOPT OD.
surgery

## 2021-08-06 NOTE — PATIENT DISCUSSION
11/6/20: SIGNIFICANT IMPROVEMENT OF INFLAMMATION IN ANTERIOR SEGMENT. LABS UNREMARKABLE. [address] [facility] [address] MCCOY TO REQUEST PET SCAN TO R/O METS FROM UTERINE CA. UNLIKELY TO BE TB, BUT WE'LL SEND TEST. RF + BUT LIKELY FALSE + GIVEN AGE. WILL DO PST OD TO EVAL RESPONSE.

## 2021-08-06 NOTE — PATIENT DISCUSSION
12/22/20: PT 10 [facility]. WILL REASSESS W SCAN IN FEB. SIGNIFICANT IMPROVEMENT SEEN TODAY OD>OS. WILL START TAPERING PREDNISONE TO 50 & PF SLOWLY SINCE PT HAS LYMPHEDEMA.

## 2021-08-06 NOTE — PATIENT DISCUSSION
4/21/21: PERSISTENT CME OD. OS RESOLVED Phoenix Burgess. CONSIDER OZURDEX IN FUTURE APPTS. PF QD & PREDNISONE 20 MG.

## 2021-08-06 NOTE — PATIENT DISCUSSION
12/14/20: PT HASN'T SEEN DR MCCOY. SHE'S NOT KEEN ABOUT CHEMO, WHICH IS HER ONLY OPTION AT THIS TIME. PRED HAS CAUSED WORSE EDEMA IN HER BODY, BUT HER EYES ARE SLOWLY GETTING BETTER. VA IN OD IS LIMITED 2/2 PSC & INFLAMMATION. WILL EVAL IN 1 WK AFTER 2 WKS OF TX W PREDNISONE. CONSIDER ANTI-VEGF. DISCUSSED AGAIN [address] [place].

## 2021-08-06 NOTE — PATIENT DISCUSSION
5/21/21: IN PREPARATION FOR CE, WILL TX OD TODAY W AVASTIN (Cedric Aleman LAST MO) & REASSESS IN 1 MO OU.

## 2021-08-13 NOTE — PATIENT DISCUSSION
7/12/21: Pt has hx of uveitis and macular edema ou, recently with Ozurdex injection OU by Dr. Sandra Shea, on oral steroids 20mg.  Will defer to Dr. Sandra Shea for PO steroid instructions after cat surgery.

## 2021-08-13 NOTE — PATIENT DISCUSSION
hypotony on exam one pod1 and pod4-> Patient may have low IOP secondary to uveitis vs. microleak (although siedel negative)   Eye pressure did feel low in OR prior to incisions. Will add contact lens for possible leak and recheck IOP in a few days .  STOP COSOPT OD.

## 2021-08-13 NOTE — PATIENT DISCUSSION
The Patient is at increased risk for post-operative macular edema and inflammation. In addition to the standard post-operative drops, an additional steroid drop to be tapered over 2 months will be added. No

## 2021-08-13 NOTE — PATIENT DISCUSSION
7/12/21: Pt has hx of uveitis and macular edema ou, recently with Ozurdex injection OU by Dr. Mulu Akers, on oral steroids 20mg.  Will defer to Dr. Mulu Akers for PO steroid instructions after cat surgery.

## 2021-08-13 NOTE — PATIENT DISCUSSION
Continue following with Dr. Cecy Elliott Possibly secondary to [facility] (for Uterine cancer).

## 2021-08-13 NOTE — PATIENT DISCUSSION
POD4: patient continues with hypotony, although continues to be margarita negative.  Descemet folds secondary to hypotony. BCL removed. Spoke with Dr. Otto Sarabia who mentions that patient has had hx of cyclical hypotony.  Will refer to Dr. Otto Sarabia for hypotony followup exam.  Continue post op drops including Prednisolone forte at 6x/day.

## 2021-08-13 NOTE — PATIENT DISCUSSION
POD4: patient continues with hypotony, although continues to be margarita negative.  Descemet folds secondary to hypotony. BCL removed. Spoke with Dr. Jacky Delgadillo who mentions that patient has had hx of cyclical hypotony.  Will refer to Dr. Jacky Delgadillo for hypotony followup exam.  Continue post op drops including Prednisolone forte at 6x/day.

## 2021-08-13 NOTE — PATIENT DISCUSSION
7/12/21: Pt has hx of uveitis and macular edema ou, recently with Ozurdex injection OU by Dr. eFroz Joshi, on oral steroids 20mg.  Will defer to Dr. Feroz Joshi for PO steroid instructions after cat surgery.

## 2021-08-13 NOTE — PATIENT DISCUSSION
POD4: patient continues with hypotony, although continues to be margarita negative.  Descemet folds secondary to hypotony. BCL removed. Spoke with Dr. Wan Calderon who mentions that patient has had hx of cyclical hypotony.  Will refer to Dr. Wan Calderon for hypotony followup exam.  Continue post op drops including Prednisolone forte at 6x/day.

## 2021-08-13 NOTE — PATIENT DISCUSSION
Continue following with Dr. Ivania Cheney Possibly secondary to [facility] (for Uterine cancer).

## 2021-08-13 NOTE — PATIENT DISCUSSION
Continue following with Dr. Angela Alford Possibly secondary to [facility] (for Uterine cancer).

## 2021-08-16 NOTE — PATIENT DISCUSSION
7/12/21: Pt has hx of uveitis and macular edema ou, recently with Ozurdex injection OU by Dr. Dejuan Velasquez, on oral steroids 20mg.  Will defer to Dr. Dejuan Velasquez for PO steroid instructions after cat surgery.

## 2021-08-16 NOTE — PATIENT DISCUSSION
Continue following with Dr. Gregoria Sousa Possibly secondary to [facility] (for Uterine cancer).

## 2021-08-16 NOTE — PATIENT DISCUSSION
Patient instructed to continue follow-up with referring provider in 3 weeks. Patient to come back to [facility] in 1 month for MR for glasses.

## 2021-08-16 NOTE — PATIENT DISCUSSION
POD4: patient continues with hypotony, although continues to be margarita negative.  Descemet folds secondary to hypotony. BCL removed. Spoke with Dr. Jessica Calhoun who mentions that patient has had hx of cyclical hypotony.  Will refer to Dr. Jessica Calhoun for hypotony followup exam.  Continue post op drops including Prednisolone forte at 6x/day.

## 2021-10-19 ENCOUNTER — IMPORTED ENCOUNTER (OUTPATIENT)
Dept: URBAN - METROPOLITAN AREA CLINIC 31 | Facility: CLINIC | Age: 86
End: 2021-10-19

## 2021-10-19 PROBLEM — H16.141: Noted: 2021-10-19

## 2021-10-19 PROBLEM — H04.123: Noted: 2021-10-19

## 2021-10-19 PROCEDURE — 99214 OFFICE O/P EST MOD 30 MIN: CPT

## 2021-10-19 NOTE — PATIENT DISCUSSION
1.  SPK  OD:  Dense ----Start using retaine MGD PF tears 4-6x per day and Retaine dimple qhs. May need steroid or restasis if continues-Pt states she was never told had dry eyes. Saw a DR in [place] 1 mth ago and he said she had an infection. 2.  Dry Eye OU:  Continue current management with Artificial Tears.   3.  RTN  2-3 weeks OC--FU on punctate OD

## 2021-11-02 NOTE — PATIENT DISCUSSION
1/22/21: CONT TO IMPROVE.  MONITOR & KEEP TAPERING QUICKLY PRED TO AVOID FURTHER SYSTEMIC SE.
1/22/21: RECURRED. COULD BE 2/2 TAPERING OF PREDNISONE. WILL TREAT W TRIESENCE & EVAL IMMUNOSUPRESSORS IF NO RESPONSE.  NO MANY OPTIONS GIVEN SYSTEMIC STATE W CANCER TX.
1/22/21: RESOLVED.
1/22/21: SIGN IMPROVEMENT. NO INFLAMM ANTERIORLY BUT REACTIVATION OF CME. WILL TREAT W TRIESENCE TO EVAL RESPONSE BEFORE TX OS.
1/22/21: UNCLEAR ETIOLOGY. TRIESENCE WILL HELP US TO IMPROVE IOP. NO RD ON US, BUT PT IS APPLYING BRIMONIDINE. TO D/C NOW.
1/5/21: BASICALLY RESOLVED. WILL CONT TX AS PLANNED. TAPER PF & PRED TO 40 UNTIL NEXT APPT.
10/27/20: Patient instructed to use topical steroids and cycloplegics.
10/27/20: WE'LL AWAIT RESOLUTION/IMPROVEMENT OF UVEITIS TO CONTINUE W ANTI-VEGF SINCE PT MAY BENEFIT FROM STEROIDS. MONITOR CLINICALLY.
11/19/20: LIKELY 2/2 #3, BUT POSSIBLY INFLUENCED BY A REVERSE PUPILLARY BLOCK VS IRIS RETRACTION SX- DILATION.
11/19/20: SIMILAR AMT OF INFLAMMATION. PT WILL UNDERGO PET SCAN NEXT WK TO DETERMINE INFLUENCE OF SYSTEMIC CONDITION, BUT LIKELY 2/2 Yara Dec.
11/6/20: CONSIDER STEROID BUT WILL AWAIT IOP RESPONSE TO PST OD TO DETERMINE SAFETY OF INJ IN OS.
11/6/20: SIGNIFICANT IMPROVEMENT OF INFLAMMATION IN ANTERIOR SEGMENT. LABS UNREMARKABLE. [address] [facility] [address] MCCOY TO REQUEST PET SCAN TO R/O METS FROM UTERINE CA. UNLIKELY TO BE TB, BUT WE'LL SEND TEST. RF + BUT LIKELY FALSE + GIVEN AGE. WILL DO PST OD TO EVAL RESPONSE.
12/14/20: IMPROVED.
12/14/20: PT HASN'T SEEN DR MCCOY. SHE'S NOT KEEN ABOUT CHEMO, WHICH IS HER ONLY OPTION AT THIS TIME. PRED HAS CAUSED WORSE EDEMA IN HER BODY, BUT HER EYES ARE SLOWLY GETTING BETTER. VA IN OD IS LIMITED 2/2 PSC & INFLAMMATION. WILL EVAL IN 1 WK AFTER 2 WKS OF TX W PREDNISONE. CONSIDER ANTI-VEGF. DISCUSSED AGAIN [address] OF [place].
12/14/20: likely RELATED TO UVEITIS. WILL CONT TX WITH PREDNISONE & REASSESS AFTER EVAL BY DR MCCOY.
12/22/20: PT [facility]. WILL REASSESS W SCAN IN FEB. SIGNIFICANT IMPROVEMENT SEEN TODAY OD>OS. WILL START TAPERING PREDNISONE TO 50 & PF SLOWLY SINCE PT HAS LYMPHEDEMA.
12/22/20: RESOLVING. CONT TX AS PLANNED.
12/4/20: DISCUSSED W PT RESULTS OF PET SCAN: SYSTEMIC PROGRESSION. OCULAR INFLAMMATION ONLY. PRED 60 MG/D AND F/U IN 1WK. PT IS AWARE.
2/12/21: IMPROVED. MONITOR.
2/12/21: PERSISTENT CME. TX AS NEEDED W FURTHER TAPERING OF PREDNISONE GIVEN SIDE EFFECTS. OVERALL GENERAL CONDITION IS STABLE.
2/12/21: RESOLVED. MONITOR.
2/12/21: WILL TREAT W TRIESENCE OS. BUMP UP PREDNISONE TO 20MG/D TO ASSESS RESPONSE.
4/21/21: Haley Lizarraga. OBSERVE.
4/21/21: NO EVIDENCE OF IOP CONCERNS. WILL D/C ATROPINE & RECHECK IN 1 MO.
4/21/21: PERSISTENT CME OD. OS RESOLVED Ifeanyi Garnett. CONSIDER OZURDEX IN FUTURE APPTS. PF QD & PREDNISONE 20 MG.
4/21/21: PERSISTENT OD. WILL TREAT W TRIESENCE TODAY. RETREAT IN OS IN 1 MO IN PREPARATION FOR CE OS.
4/21/21; STABILITY CONFIRMED ON OCT. MONITOR.
ARTIFICIAL TEARS to affected eye(s) as needed.
BMI above normal limits, recommended weight loss, improve diet and follow up with internist.
Based on today’s exam, diagnostic studies, and review of records, the determination was made for TRIESENSE 4mg recommended TODAY after discussion of benefits, risks and alternatives. WILL TREAT OD ONLY AND SEE RESPONSE BEFORE TX OS. The injection was given without complication and tolerated well by the patient. Post-injection instructions were reviewed and understood by the patient. Procedure was performed without complications. Instructed to call immediately if any new distortion, blurring, decreased vision or eye pain.
Cataract APPEARS BORDERLINE VISUALLY SIGNIFICANT GIVEN PSC AND PS. MONITOR CLOSELY.
Does NOT APPEAR VISUALLY SIGNIFICANT.
FORMER SMOKER-.
IOP LOW LIKELY 2/2 UVEITIS. WE'LL STOP SOME OF MEDS. WILL KEEP DORZOLAMIDE BID OD ONLY, FOR NOW.
LAST LUCENTIS 0,5 INJECTION- OS- DR. HENDRIX- 10/16/2020.
MONITOR response to TREATMENT.
My findings and recommendations are based on patient's symptoms, eye exam, diagnostic testing, and records.
No retinal holes or tears seen on exam. Recommended OBSERVATION. We reviewed the signs and symptoms of retinal tear/retinal detachment and the importance of prompt evaluation should there be increasing floaters, new flashing lights, or decreasing peripheral vision in either eye at any time. Patient understands condition, prognosis and need for follow up care.
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
Reassured that isolated subconjunctival hemorrhage does not correlate with intraocular bleeding or vision loss.
Recommend follow up with patients GENERAL DOCTOR for evaluation of other caused, such as SINUS DISEASE.
Recommended OBSERVATION and MONITORING for progression.
Recommended OBSERVATION and continued MONITORING for progression.
Recommended Observation.
The patient was asked to get an EXTENSIVE SEROLOGICAL WORKUP in an effort to identify any systemic autoimmune or infectious etiology as the cause for their severe inflammation. PT GOT HER BLOOD WORK TODAY. WE'LL REVIEW THE RESULTS AND TREAT ACCORDINGLY. MAIN DDX: INFECTIOUS -GIVEN HER IMMUNOSUPPRESSION-, CA OR AUTOIMMUNE (LESS LIKELY). LOW THRESHOLD TO START PO STEROIDS ONCE INFECTION HAS BEEN RULED OUT.
WILL CONT ATROPINE. REINFORCED IMPORTANCE OF MEDICATION TO AVOID IOP SPIKES. WILL DO GONIO NEXT VISIT, ANGLE SEEMS TO BE OPEN.  PO STEROIDS 60MG/D.
WILL MANAGE UVEITIS AS ABOVE.
as above.
To get better and follow your care plan as instructed.
Statement Selected

## 2021-11-11 ENCOUNTER — IMPORTED ENCOUNTER (OUTPATIENT)
Dept: URBAN - METROPOLITAN AREA CLINIC 31 | Facility: CLINIC | Age: 86
End: 2021-11-11

## 2021-11-11 PROBLEM — H04.123: Noted: 2021-11-11

## 2021-11-11 PROBLEM — H16.141: Noted: 2021-11-11

## 2021-11-11 PROCEDURE — 99213 OFFICE O/P EST LOW 20 MIN: CPT

## 2021-11-11 NOTE — PATIENT DISCUSSION
1.  SPK  OD:  Dense ----Better-- Cont retaine MGD PF tears 4-6x per day and Retaine dimple qhs. May need steroid or restasis if continues-Pt states she was never told had dry eyes. Saw a DR in [place] 1 mth ago and he said she had an infection. 2.  Dry Eye OU:  Continue current management with Artificial Tears.   3.  RTN  12/21  CE DR Darreld Kussmaul

## 2021-12-09 ENCOUNTER — IMPORTED ENCOUNTER (OUTPATIENT)
Dept: URBAN - METROPOLITAN AREA CLINIC 31 | Facility: CLINIC | Age: 86
End: 2021-12-09

## 2021-12-09 ENCOUNTER — PREPPED CHART (OUTPATIENT)
Dept: URBAN - METROPOLITAN AREA CLINIC 34 | Facility: CLINIC | Age: 86
End: 2021-12-09

## 2021-12-09 PROBLEM — H40.1432: Noted: 2021-12-09

## 2021-12-09 PROBLEM — H27.02: Noted: 2021-12-09

## 2021-12-09 PROBLEM — Z96.1: Noted: 2021-12-09

## 2021-12-09 PROCEDURE — 92083 EXTENDED VISUAL FIELD XM: CPT

## 2021-12-09 PROCEDURE — 99213 OFFICE O/P EST LOW 20 MIN: CPT

## 2021-12-09 NOTE — PATIENT DISCUSSION
1.  PXE OU - VF stable IOP OK   Discussed importance of compliance. Will continue to monitor. 2.  Pseudophakia OD - IOL stable. Monitor. 3. Aphakia OS - stable with CL. Return for an appointment in 6 months for pressure check. OCT Nerve. with Dr. Jhonatan Fernández.

## 2022-04-01 ASSESSMENT — VISUAL ACUITY
OS_CC: 20/400
OS_SC: 20/30-2
OS_SC: 20/30-2
OS_PH: SC 20/80
OS_SC: 20/30+2
OS_SC: 20/30-1
OD_CC: 20/60
OS_CC: 20/40
OU_SC: J216''
OD_PH: SC 20/50
OD_SC: 20/30
OD_CC: 20/100
OD_CC: 20/100
OD_CC: 20/50+2
OS_SC: 20/30
OD_CC: 20/70
OS_CC: 20/70-1
OS_CC: 20/25
OD_SC: 20/25
OD_PH: SC 20/40
OD_CC: 20/70
OD_CC: 20/50-2
OD_CC: 20/200
OD_CC: 20/100+2
OD_SC: J216''
OD_CC: 20/70-2
OU_SC: 20/30
OS_CC: 20/800
OD_PH: SC 20/50
OD_PH: SC 20/40 +2
OS_SC: 20/800
OS_SC: 20/30-1

## 2022-04-01 ASSESSMENT — TONOMETRY
OD_IOP_MMHG: 15
OS_IOP_MMHG: 15
OS_IOP_MMHG: 15
OD_IOP_MMHG: 14
OS_IOP_MMHG: 16
OD_IOP_MMHG: 14
OD_IOP_MMHG: 15
OS_IOP_MMHG: 15
OS_IOP_MMHG: 17
OD_IOP_MMHG: 14
OD_IOP_MMHG: 14
OS_IOP_MMHG: 16
OS_IOP_MMHG: 16
OD_IOP_MMHG: 14
OS_IOP_MMHG: 16
OD_IOP_MMHG: 14
OD_IOP_MMHG: 13

## 2022-04-28 ENCOUNTER — ESTABLISHED PATIENT (OUTPATIENT)
Dept: URBAN - METROPOLITAN AREA CLINIC 34 | Facility: CLINIC | Age: 87
End: 2022-04-28

## 2022-04-28 DIAGNOSIS — Z96.1: ICD-10-CM

## 2022-04-28 DIAGNOSIS — H27.02: ICD-10-CM

## 2022-04-28 DIAGNOSIS — H40.1432: ICD-10-CM

## 2022-04-28 PROCEDURE — 99214 OFFICE O/P EST MOD 30 MIN: CPT

## 2022-04-28 PROCEDURE — 92133 CPTRZD OPH DX IMG PST SGM ON: CPT

## 2022-04-28 ASSESSMENT — TONOMETRY
OD_IOP_MMHG: 13
OS_IOP_MMHG: 17

## 2022-04-28 ASSESSMENT — VISUAL ACUITY
OD_SC: 20/80-1
OS_CC: 20/30-2

## 2022-05-24 ENCOUNTER — PREPPED CHART (OUTPATIENT)
Dept: URBAN - METROPOLITAN AREA CLINIC 29 | Facility: CLINIC | Age: 87
End: 2022-05-24

## 2022-05-24 NOTE — PATIENT DISCUSSION
Addended by: Julianna Hollis on: 5/24/2022 01:42 PM     Modules accepted: Orders Discussed lid hygiene, warm compress and eyelid wash.

## 2023-03-08 NOTE — PATIENT DISCUSSION
Refer to Dr. Sanchez Stewart for consult. See ocular hypertension. first attempt to contact patient  wrong number  Number not in service

## 2023-09-06 NOTE — PATIENT DISCUSSION
1/22/21: SIGN IMPROVEMENT. NO INFLAMM ANTERIORLY BUT REACTIVATION OF CME. WILL TREAT W TRIESENCE TO EVAL RESPONSE BEFORE TX OS. as per primary team

## 2024-01-02 NOTE — PATIENT DISCUSSION
6/30/21: WE MAY NEVER GET COMPLETE RESOLUTION OF CME BEFORE CE, HOWEVER WE'LL TRY TO GET IT AS STABLE AS POSSIBLE. WOULD START W OD. SHE WILL NEED STRONG PERIOPERATIVE ANTI-INFLAMMATORY TX TO AVOID FURTHER WORSENING (PO STEROIDS + PERIOCULAR STEROID + DROPS). negative responds to verbal commands

## 2025-06-18 NOTE — PATIENT DISCUSSION
The patient would like to defer cataract surgery at this time. Imiquimod Counseling:  I discussed with the patient the risks of imiquimod including but not limited to erythema, scaling, itching, weeping, crusting, and pain.  Patient understands that the inflammatory response to imiquimod is variable from person to person and was educated regarded proper titration schedule.  If flu-like symptoms develop, patient knows to discontinue the medication and contact us.